# Patient Record
Sex: MALE | Race: WHITE | NOT HISPANIC OR LATINO | Employment: FULL TIME | URBAN - METROPOLITAN AREA
[De-identification: names, ages, dates, MRNs, and addresses within clinical notes are randomized per-mention and may not be internally consistent; named-entity substitution may affect disease eponyms.]

---

## 2024-02-23 ENCOUNTER — APPOINTMENT (OUTPATIENT)
Dept: PRIMARY CARE | Facility: CLINIC | Age: 44
End: 2024-02-23
Payer: COMMERCIAL

## 2024-03-13 ENCOUNTER — OFFICE VISIT (OUTPATIENT)
Dept: PRIMARY CARE | Facility: CLINIC | Age: 44
End: 2024-03-13
Payer: COMMERCIAL

## 2024-03-13 VITALS
BODY MASS INDEX: 29.2 KG/M2 | WEIGHT: 204 LBS | HEIGHT: 70 IN | TEMPERATURE: 97 F | HEART RATE: 88 BPM | DIASTOLIC BLOOD PRESSURE: 98 MMHG | SYSTOLIC BLOOD PRESSURE: 142 MMHG | RESPIRATION RATE: 18 BRPM

## 2024-03-13 DIAGNOSIS — Z80.8 FHX: MELANOMA: ICD-10-CM

## 2024-03-13 DIAGNOSIS — I10 PRIMARY HYPERTENSION: ICD-10-CM

## 2024-03-13 DIAGNOSIS — Z00.00 ENCOUNTER FOR PREVENTIVE HEALTH EXAMINATION: ICD-10-CM

## 2024-03-13 DIAGNOSIS — Z12.5 PROSTATE CANCER SCREENING: Primary | ICD-10-CM

## 2024-03-13 DIAGNOSIS — Z80.42 FAMILY HISTORY OF PROSTATE CANCER IN FATHER: ICD-10-CM

## 2024-03-13 DIAGNOSIS — J35.8 TONSILLAR MASS: ICD-10-CM

## 2024-03-13 PROCEDURE — 3077F SYST BP >= 140 MM HG: CPT | Performed by: FAMILY MEDICINE

## 2024-03-13 PROCEDURE — 3080F DIAST BP >= 90 MM HG: CPT | Performed by: FAMILY MEDICINE

## 2024-03-13 PROCEDURE — 99396 PREV VISIT EST AGE 40-64: CPT | Performed by: FAMILY MEDICINE

## 2024-03-13 PROCEDURE — 1036F TOBACCO NON-USER: CPT | Performed by: FAMILY MEDICINE

## 2024-03-13 PROCEDURE — 99212 OFFICE O/P EST SF 10 MIN: CPT | Performed by: FAMILY MEDICINE

## 2024-03-13 RX ORDER — LOSARTAN POTASSIUM 25 MG/1
25 TABLET ORAL DAILY
Qty: 30 TABLET | Refills: 6 | Status: SHIPPED | OUTPATIENT
Start: 2024-03-13 | End: 2024-04-10 | Stop reason: SDUPTHER

## 2024-03-13 NOTE — PROGRESS NOTES
"Kyrie Cardoso is a 44 y.o. male here today for here to get established.  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    HPI     Left tonsil with white spot since ST 2 months ago.  White spot.  No pain.  He has no previous history of cryptic tonsils or tonsiliths.      BP has been a little elevated for a number of years.   Dad with h/o HTN in 60's.  No HTN sxs.  He says whenever his blood pressure is checked for the last few years it is usually higher than 140 systolic.  He eats a low-sodium diet and exercises regularly.  He does not drink any caffeine.  His weight has been stable.    PMH:  Adult ADHD.  Previously on Strattera.  No other conditions.    He feels like his adult ADHD is still present but not bothersome enough to consider medications at this time.    Surg Hx:  dental x 1.    Soc Hx: no tob or vapping.  ETOH - occ on weekends.  No drug use.  Exercises regularly - 90 mins per week vigorous.      Family hx:  2 children - boy and girl.  .  Lives in [place].      Dad with prostate cancer.  Also with family history of malignant melanoma.    We reviewed preventative health recommendations.  Needs colonoscopy, cardiac CT, labs.      Imms are UTD.      Never seroconverted on Hep B immunizations - 5 series of imms.        Current Outpatient Medications:     losartan (Cozaar) 25 mg tablet, Take 1 tablet (25 mg) by mouth once daily., Disp: 30 tablet, Rfl: 6    Patient Active Problem List   Diagnosis    Family history of prostate cancer in father    Tonsillar mass    FHx: melanoma    Primary hypertension         No results found for this or any previous visit (from the past 672 hour(s)).     Objective    Visit Vitals    Visit Vitals  BP (!) 142/98   Pulse 88   Temp 36.1 °C (97 °F)   Resp 18   Ht 1.778 m (5' 10\")   Wt 92.5 kg (204 lb)   BMI 29.27 kg/m²   Smoking Status Never   BSA 2.14 m²       Body mass index is 29.27 kg/m².     Physical Exam   General - Not " in acute distress and cooperative.  Build & Nutrition - Well developed  Posture - Normal  Gait - Normal  Mental Status - alert and oriented x 3    Head - Normocephalic    Neck - Thyroid normal size    Eyes - Bilateral - Sclera clear and lids pink without edema or mass.      Skin - Warm and dry with no rashes on visible skin    Lungs - Clear to auscultation and normal breathing effort    Cardiovascular - RRR and no murmurs, rubs or thrill.    Peripheral Vascular - Bilateral - no edema present    Neuropsychiatric - normal mood and affect    Oropharynx -on the left tonsil is a 5 mm white mass.  There is no surrounding erythema and the tonsil does not appear swollen.  The right tonsil is normal.    Assessment    1. Prostate cancer screening  Prostate Specific Antigen   With his family history of prostate cancer in his father we will order a PSA.     2. Encounter for preventive health examination  Comprehensive Metabolic Panel, CBC, Lipid Panel, Thyroid Stimulating Hormone, Vitamin D 25-Hydroxy,Total (for eval of Vitamin D levels), Hepatitis C Antibody, HIV 1/2 Antigen/Antibody Screen with Reflex to Confirmation, Colonoscopy Screening; Average Risk Patient, CT cardiac scoring wo IV contrast   Recommend regular exercise, balanced diet, regular dental exams, and healthy habits.  Appropriate labs ordered or reviewed.  I recommend to eat plenty of plant foods (such as whole-grain products, fruits, and vegetables) and a moderate amount of lean and low-fat, animal-based food (meat and dairy products).  When shopping, choose lean meats, fish, and poultry. I recommend to increase aerobic exercise.  I have ordered a colonoscopy, cardiac CT scan, PSA.  His immunizations are up-to-date.  I recommend a yearly flu shot in the fall and I recommend a yearly wellness exam.         3. Family history of prostate cancer in father        4. Tonsillar mass  Referral to ENT   This white spot on his left tonsil may be a retained tonsillith but  I am going to refer him to Dr. Amezcua for evaluation since he has never had this before.     5. Primary hypertension  Comprehensive Metabolic Panel, CBC, Lipid Panel, Thyroid Stimulating Hormone, losartan (Cozaar) 25 mg tablet, CT cardiac scoring wo IV contrast   Patient definitely meets criteria for hypertension at this point.  We discussed causes, diagnosis, possible treatment options for hypertension.  Much education offered on how to lower BP without medications.  Discussed the role of Na, weight and exercise in regard to blood pressure control.  We are going to start losartan 25 mg once daily and follow-up in 1 month for recheck.     6. FHx: melanoma  Referral to Dermatology   I will refer him to dermatology for yearly full-body skin exams since there is a family history of skin cancer.         Orders Placed This Encounter      losartan (Cozaar) 25 mg tablet       Orders Placed This Encounter   Procedures    CT cardiac scoring wo IV contrast    Comprehensive Metabolic Panel    CBC    Lipid Panel    Thyroid Stimulating Hormone    Vitamin D 25-Hydroxy,Total (for eval of Vitamin D levels)    Prostate Specific Antigen    Hepatitis C Antibody    HIV 1/2 Antigen/Antibody Screen with Reflex to Confirmation    Referral to ENT    Referral to Dermatology        New Medications Ordered This Visit   Medications    losartan (Cozaar) 25 mg tablet     Sig: Take 1 tablet (25 mg) by mouth once daily.     Dispense:  30 tablet     Refill:  6

## 2024-03-14 ENCOUNTER — TELEPHONE (OUTPATIENT)
Dept: GASTROENTEROLOGY | Facility: CLINIC | Age: 44
End: 2024-03-14

## 2024-03-14 ENCOUNTER — LAB (OUTPATIENT)
Dept: LAB | Facility: LAB | Age: 44
End: 2024-03-14
Payer: COMMERCIAL

## 2024-03-14 DIAGNOSIS — Z00.00 ENCOUNTER FOR PREVENTIVE HEALTH EXAMINATION: ICD-10-CM

## 2024-03-14 DIAGNOSIS — I10 PRIMARY HYPERTENSION: ICD-10-CM

## 2024-03-14 DIAGNOSIS — Z12.5 PROSTATE CANCER SCREENING: ICD-10-CM

## 2024-03-14 LAB
25(OH)D3 SERPL-MCNC: 28 NG/ML (ref 30–100)
ALBUMIN SERPL BCP-MCNC: 4.5 G/DL (ref 3.4–5)
ALP SERPL-CCNC: 49 U/L (ref 33–120)
ALT SERPL W P-5'-P-CCNC: 15 U/L (ref 10–52)
ANION GAP SERPL CALC-SCNC: 11 MMOL/L (ref 10–20)
AST SERPL W P-5'-P-CCNC: 19 U/L (ref 9–39)
BILIRUB SERPL-MCNC: 0.8 MG/DL (ref 0–1.2)
BUN SERPL-MCNC: 15 MG/DL (ref 6–23)
CALCIUM SERPL-MCNC: 8.9 MG/DL (ref 8.6–10.3)
CHLORIDE SERPL-SCNC: 107 MMOL/L (ref 98–107)
CHOLEST SERPL-MCNC: 223 MG/DL (ref 0–199)
CHOLESTEROL/HDL RATIO: 5.4
CO2 SERPL-SCNC: 28 MMOL/L (ref 21–32)
CREAT SERPL-MCNC: 1.17 MG/DL (ref 0.5–1.3)
EGFRCR SERPLBLD CKD-EPI 2021: 78 ML/MIN/1.73M*2
ERYTHROCYTE [DISTWIDTH] IN BLOOD BY AUTOMATED COUNT: 12.3 % (ref 11.5–14.5)
GLUCOSE SERPL-MCNC: 83 MG/DL (ref 74–99)
HCT VFR BLD AUTO: 42.9 % (ref 41–52)
HCV AB SER QL: NONREACTIVE
HDLC SERPL-MCNC: 41.5 MG/DL
HGB BLD-MCNC: 14.9 G/DL (ref 13.5–17.5)
HIV 1+2 AB+HIV1 P24 AG SERPL QL IA: NONREACTIVE
LDLC SERPL CALC-MCNC: 159 MG/DL
MCH RBC QN AUTO: 31.9 PG (ref 26–34)
MCHC RBC AUTO-ENTMCNC: 34.7 G/DL (ref 32–36)
MCV RBC AUTO: 92 FL (ref 80–100)
NON HDL CHOLESTEROL: 182 MG/DL (ref 0–149)
NRBC BLD-RTO: 0 /100 WBCS (ref 0–0)
PLATELET # BLD AUTO: 164 X10*3/UL (ref 150–450)
POTASSIUM SERPL-SCNC: 4.1 MMOL/L (ref 3.5–5.3)
PROT SERPL-MCNC: 6.6 G/DL (ref 6.4–8.2)
PSA SERPL-MCNC: 0.44 NG/ML
RBC # BLD AUTO: 4.67 X10*6/UL (ref 4.5–5.9)
SODIUM SERPL-SCNC: 142 MMOL/L (ref 136–145)
TRIGL SERPL-MCNC: 115 MG/DL (ref 0–149)
TSH SERPL-ACNC: 2.25 MIU/L (ref 0.44–3.98)
VLDL: 23 MG/DL (ref 0–40)
WBC # BLD AUTO: 4.8 X10*3/UL (ref 4.4–11.3)

## 2024-03-14 PROCEDURE — 80053 COMPREHEN METABOLIC PANEL: CPT

## 2024-03-14 PROCEDURE — 82306 VITAMIN D 25 HYDROXY: CPT

## 2024-03-14 PROCEDURE — 86803 HEPATITIS C AB TEST: CPT

## 2024-03-14 PROCEDURE — 36415 COLL VENOUS BLD VENIPUNCTURE: CPT

## 2024-03-14 PROCEDURE — 84443 ASSAY THYROID STIM HORMONE: CPT

## 2024-03-14 PROCEDURE — 87389 HIV-1 AG W/HIV-1&-2 AB AG IA: CPT

## 2024-03-14 PROCEDURE — 84153 ASSAY OF PSA TOTAL: CPT

## 2024-03-14 PROCEDURE — 85027 COMPLETE CBC AUTOMATED: CPT

## 2024-03-14 PROCEDURE — 80061 LIPID PANEL: CPT

## 2024-03-14 NOTE — TELEPHONE ENCOUNTER
----- Message from Gemma Paulino sent at 3/14/2024  2:04 PM EDT -----  Regarding: NULYTELY PREP  PLEASE SEND TO:  [facility]/pharmacy #[id number] - [address]   Phone: [phone]  Fax: [phone]    4/26/2024

## 2024-03-15 ENCOUNTER — TELEPHONE (OUTPATIENT)
Dept: PRIMARY CARE | Facility: CLINIC | Age: 44
End: 2024-03-15
Payer: COMMERCIAL

## 2024-03-15 DIAGNOSIS — E78.2 MIXED HYPERLIPIDEMIA: ICD-10-CM

## 2024-03-15 DIAGNOSIS — E55.9 VITAMIN D DEFICIENCY: ICD-10-CM

## 2024-03-15 PROBLEM — L20.9: Status: ACTIVE | Noted: 2024-03-15

## 2024-03-15 PROBLEM — F90.9 ADHD (ATTENTION DEFICIT HYPERACTIVITY DISORDER): Status: ACTIVE | Noted: 2024-03-15

## 2024-03-15 PROBLEM — H52.222 REGULAR ASTIGMATISM OF LEFT EYE: Status: ACTIVE | Noted: 2024-03-15

## 2024-03-15 PROBLEM — H52.13 MYOPIA OF BOTH EYES: Status: ACTIVE | Noted: 2024-03-15

## 2024-03-15 PROBLEM — E78.5 HYPERLIPIDEMIA: Status: ACTIVE | Noted: 2017-08-18

## 2024-03-15 NOTE — RESULT ENCOUNTER NOTE
Please inform the patient that his total cholesterol was slightly elevated at 223 and his LDL cholesterol was elevated at 159.  However his ASCVD score is not high enough to recommend medications at this time.  I recommend to eat plenty of plant foods (such as whole-grain products, fruits, and vegetables) and a moderate amount of lean and low-fat, animal-based food (meat and dairy products).  When shopping, choose lean meats, fish, and poultry. I recommend to increase aerobic exercise.  Please add hyperlipidemia to his diagnosis list.    Also his vitamin D was slightly low at 28.  I recommend that he take over-the-counter vitamin D 2000 units daily.  Please add vitamin D deficiency to his diagnosis list.    I would like to recheck his lipid panel and vitamin D in 4 months.  Please order these labs.    The rest of his labs were all essentially normal.

## 2024-03-15 NOTE — TELEPHONE ENCOUNTER
----- Message from Alok Cagle MD sent at 3/15/2024  9:15 AM EDT -----  Please inform the patient that his total cholesterol was slightly elevated at 223 and his LDL cholesterol was elevated at 159.  However his ASCVD score is not high enough to recommend medications at this time.  I recommend to eat plenty of plant foods (such as whole-grain products, fruits, and vegetables) and a moderate amount of lean and low-fat, animal-based food (meat and dairy products).  When shopping, choose lean meats, fish, and poultry. I recommend to increase aerobic exercise.  Please add hyperlipidemia to his diagnosis list.    Also his vitamin D was slightly low at 28.  I recommend that he take over-the-counter vitamin D 2000 units daily.  Please add vitamin D deficiency to his diagnosis list.    I would like to recheck his lipid panel and vitamin D in 4 months.  Please order these labs.    The rest of his labs were all essentially normal.

## 2024-04-10 ENCOUNTER — OFFICE VISIT (OUTPATIENT)
Dept: PRIMARY CARE | Facility: CLINIC | Age: 44
End: 2024-04-10
Payer: COMMERCIAL

## 2024-04-10 VITALS
BODY MASS INDEX: 29.35 KG/M2 | HEIGHT: 70 IN | TEMPERATURE: 96.7 F | WEIGHT: 205 LBS | DIASTOLIC BLOOD PRESSURE: 86 MMHG | HEART RATE: 67 BPM | SYSTOLIC BLOOD PRESSURE: 128 MMHG | RESPIRATION RATE: 16 BRPM

## 2024-04-10 DIAGNOSIS — E78.2 MIXED HYPERLIPIDEMIA: Primary | ICD-10-CM

## 2024-04-10 DIAGNOSIS — I10 PRIMARY HYPERTENSION: ICD-10-CM

## 2024-04-10 DIAGNOSIS — E55.9 VITAMIN D DEFICIENCY: ICD-10-CM

## 2024-04-10 PROCEDURE — 3079F DIAST BP 80-89 MM HG: CPT | Performed by: FAMILY MEDICINE

## 2024-04-10 PROCEDURE — 3074F SYST BP LT 130 MM HG: CPT | Performed by: FAMILY MEDICINE

## 2024-04-10 PROCEDURE — 1036F TOBACCO NON-USER: CPT | Performed by: FAMILY MEDICINE

## 2024-04-10 PROCEDURE — 99213 OFFICE O/P EST LOW 20 MIN: CPT | Performed by: FAMILY MEDICINE

## 2024-04-10 RX ORDER — LOSARTAN POTASSIUM 25 MG/1
25 TABLET ORAL DAILY
Qty: 90 TABLET | Refills: 3 | Status: SHIPPED | OUTPATIENT
Start: 2024-04-10

## 2024-04-10 NOTE — PROGRESS NOTES
Kyrie Cardoso is a 44 y.o. male here today for   Chief Complaint   Patient presents with    Hypertension     1 month recheck         HPI     HTN recheck -- Patient denies chest pain, SOB, edema, palpitations on review.  Taking medication correctly and denies any side effects.   BP seems well controlled with losartan now.      Cholesterol was sltly elevated.  But his current 10-year ASCVD risk is 3.6% and he did not want to start medication at this time.    He does have an upcoming appointment with Dr. Amezcua to check the left tonsil white spot.        Current Outpatient Medications:     losartan (Cozaar) 25 mg tablet, Take 1 tablet (25 mg) by mouth once daily., Disp: 90 tablet, Rfl: 3    Patient Active Problem List   Diagnosis    Family history of prostate cancer in father    Tonsillar mass    FHx: melanoma    Primary hypertension    Regular astigmatism of left eye    Myopia of both eyes    Mixed hyperlipidemia    ADHD, adult residual type    ADHD (attention deficit hyperactivity disorder)    Acute atopic dermatitis of hand    Vitamin D deficiency         Recent Results (from the past 672 hour(s))   Comprehensive Metabolic Panel    Collection Time: 03/14/24  8:08 AM   Result Value Ref Range    Glucose 83 74 - 99 mg/dL    Sodium 142 136 - 145 mmol/L    Potassium 4.1 3.5 - 5.3 mmol/L    Chloride 107 98 - 107 mmol/L    Bicarbonate 28 21 - 32 mmol/L    Anion Gap 11 10 - 20 mmol/L    Urea Nitrogen 15 6 - 23 mg/dL    Creatinine 1.17 0.50 - 1.30 mg/dL    eGFR 78 >60 mL/min/1.73m*2    Calcium 8.9 8.6 - 10.3 mg/dL    Albumin 4.5 3.4 - 5.0 g/dL    Alkaline Phosphatase 49 33 - 120 U/L    Total Protein 6.6 6.4 - 8.2 g/dL    AST 19 9 - 39 U/L    Bilirubin, Total 0.8 0.0 - 1.2 mg/dL    ALT 15 10 - 52 U/L   CBC    Collection Time: 03/14/24  8:08 AM   Result Value Ref Range    WBC 4.8 4.4 - 11.3 x10*3/uL    nRBC 0.0 0.0 - 0.0 /100 WBCs    RBC 4.67 4.50 - 5.90 x10*6/uL    Hemoglobin 14.9 13.5 - 17.5 g/dL    Hematocrit 42.9 41.0 -  "52.0 %    MCV 92 80 - 100 fL    MCH 31.9 26.0 - 34.0 pg    MCHC 34.7 32.0 - 36.0 g/dL    RDW 12.3 11.5 - 14.5 %    Platelets 164 150 - 450 x10*3/uL   Lipid Panel    Collection Time: 03/14/24  8:08 AM   Result Value Ref Range    Cholesterol 223 (H) 0 - 199 mg/dL    HDL-Cholesterol 41.5 mg/dL    Cholesterol/HDL Ratio 5.4     LDL Calculated 159 (H) <=99 mg/dL    VLDL 23 0 - 40 mg/dL    Triglycerides 115 0 - 149 mg/dL    Non HDL Cholesterol 182 (H) 0 - 149 mg/dL   Thyroid Stimulating Hormone    Collection Time: 03/14/24  8:08 AM   Result Value Ref Range    Thyroid Stimulating Hormone 2.25 0.44 - 3.98 mIU/L   Vitamin D 25-Hydroxy,Total (for eval of Vitamin D levels)    Collection Time: 03/14/24  8:08 AM   Result Value Ref Range    Vitamin D, 25-Hydroxy, Total 28 (L) 30 - 100 ng/mL   Prostate Specific Antigen    Collection Time: 03/14/24  8:08 AM   Result Value Ref Range    Prostate Specific AG 0.44 <=4.00 ng/mL   Hepatitis C Antibody    Collection Time: 03/14/24  8:08 AM   Result Value Ref Range    Hepatitis C AB Nonreactive Nonreactive   HIV 1/2 Antigen/Antibody Screen with Reflex to Confirmation    Collection Time: 03/14/24  8:08 AM   Result Value Ref Range    HIV 1/2 Antigen/Antibody Screen with Reflex to Confirmation Nonreactive Nonreactive        Objective    Visit Vitals    Visit Vitals  /86   Pulse 67   Temp 35.9 °C (96.7 °F)   Resp 16   Ht 1.778 m (5' 10\")   Wt 93 kg (205 lb)   BMI 29.41 kg/m²   Smoking Status Never   BSA 2.14 m²       Body mass index is 29.41 kg/m².     Physical Exam   General - Not in acute distress and cooperative.  Build & Nutrition - Well developed  Posture - Normal  Gait - Normal  Mental Status - alert and oriented x 3    Head - Normocephalic    Neck - Thyroid normal size    Eyes - Bilateral - Sclera clear and lids pink without edema or mass.      Skin - Warm and dry with no rashes on visible skin    Lungs - Clear to auscultation and normal breathing effort    Cardiovascular - RRR and " no murmurs, rubs or thrill.    Peripheral Vascular - Bilateral - no edema present    Neuropsychiatric - normal mood and affect        Assessment    1. Mixed hyperlipidemia     His 10-year risk is 3.6% and we discussed options.  He is not interested in starting a statin at this time and I think this is reasonable.  He plans to work more closely on his diet.     2. Primary hypertension  losartan (Cozaar) 25 mg tablet   His blood pressure now seems well-controlled since we started losartan at his last visit 1 month ago.  No change in current treatment regimen.  Refill given of current medication.  We will follow-up in 1 year.     3. Vitamin D deficiency     His vitamin D was slightly low but he does not plan on supplementing because he feels like his vitamin D levels are adequate.         Orders Placed This Encounter      losartan (Cozaar) 25 mg tablet       No orders of the defined types were placed in this encounter.       New Medications Ordered This Visit   Medications    losartan (Cozaar) 25 mg tablet     Sig: Take 1 tablet (25 mg) by mouth once daily.     Dispense:  90 tablet     Refill:  3

## 2024-04-16 ENCOUNTER — ANESTHESIA EVENT (OUTPATIENT)
Dept: GASTROENTEROLOGY | Facility: EXTERNAL LOCATION | Age: 44
End: 2024-04-16

## 2024-04-17 ENCOUNTER — HOSPITAL ENCOUNTER (OUTPATIENT)
Dept: RADIOLOGY | Facility: CLINIC | Age: 44
Discharge: HOME | End: 2024-04-17
Payer: COMMERCIAL

## 2024-04-17 DIAGNOSIS — Z00.00 ENCOUNTER FOR PREVENTIVE HEALTH EXAMINATION: ICD-10-CM

## 2024-04-17 DIAGNOSIS — I10 PRIMARY HYPERTENSION: ICD-10-CM

## 2024-04-17 PROCEDURE — 75571 CT HRT W/O DYE W/CA TEST: CPT

## 2024-04-26 ENCOUNTER — APPOINTMENT (OUTPATIENT)
Dept: GASTROENTEROLOGY | Facility: EXTERNAL LOCATION | Age: 44
End: 2024-04-26
Payer: COMMERCIAL

## 2024-04-26 ENCOUNTER — ANESTHESIA (OUTPATIENT)
Dept: GASTROENTEROLOGY | Facility: EXTERNAL LOCATION | Age: 44
End: 2024-04-26

## 2024-05-13 ENCOUNTER — APPOINTMENT (OUTPATIENT)
Dept: GASTROENTEROLOGY | Facility: EXTERNAL LOCATION | Age: 44
End: 2024-05-13
Payer: COMMERCIAL

## 2025-01-07 ENCOUNTER — OFFICE VISIT (OUTPATIENT)
Dept: PRIMARY CARE | Facility: CLINIC | Age: 45
End: 2025-01-07
Payer: COMMERCIAL

## 2025-01-07 DIAGNOSIS — A15.9 TUBERCULOSIS: ICD-10-CM

## 2025-01-07 PROCEDURE — 86580 TB INTRADERMAL TEST: CPT | Performed by: INTERNAL MEDICINE

## 2025-01-07 NOTE — PROGRESS NOTES
Subjective   Patient ID: Kyrie Cardoso is a 44 y.o. male who presents for Nurse Visit.    Patient is here for TB  Placed in the left forearm

## 2025-01-08 ENCOUNTER — TELEPHONE (OUTPATIENT)
Dept: PRIMARY CARE | Facility: CLINIC | Age: 45
End: 2025-01-08
Payer: COMMERCIAL

## 2025-01-08 DIAGNOSIS — Z12.11 SCREENING FOR COLON CANCER: ICD-10-CM

## 2025-01-23 LAB — NONINV COLON CA DNA+OCC BLD SCRN STL QL: NEGATIVE

## 2025-01-29 ENCOUNTER — TELEPHONE (OUTPATIENT)
Dept: PRIMARY CARE | Facility: CLINIC | Age: 45
End: 2025-01-29
Payer: COMMERCIAL

## 2025-01-29 NOTE — TELEPHONE ENCOUNTER
----- Message from Trinidad Kaylee sent at 1/28/2025  5:21 PM EST -----  Please call patient with the following result note, he has not yet viewed his result note, thank you    Honorio Mittal, I am one of the doctors covering for Dr. Cagle today.  Your Cologuard was negative for signs of colon or rectal cancer.  This is great news.  This can be repeated in 3 years.  Let us know if you have questions or concerns.  Thanks!